# Patient Record
Sex: MALE
[De-identification: names, ages, dates, MRNs, and addresses within clinical notes are randomized per-mention and may not be internally consistent; named-entity substitution may affect disease eponyms.]

---

## 2019-06-27 ENCOUNTER — HOSPITAL ENCOUNTER (OUTPATIENT)
Dept: HOSPITAL 88 - SLEEP | Age: 39
End: 2019-06-27
Attending: INTERNAL MEDICINE
Payer: COMMERCIAL

## 2019-06-27 DIAGNOSIS — G47.33: Primary | ICD-10-CM

## 2019-06-27 DIAGNOSIS — E66.9: ICD-10-CM

## 2019-06-27 PROCEDURE — 95811 POLYSOM 6/>YRS CPAP 4/> PARM: CPT

## 2019-07-02 NOTE — POLYSOMNOGRAPHY
DATE OF STUDY:  

 

REFERRING PHYSICIAN:  

 

 

POLYSOMNOGRAM REPORT 

 

Patient of Dr. Geradr Millan. 

 

The patient with a history of daytime hypersomnolence, loud snoring witnessed by others.

 The patient is 5 feet 9 inches, 550 pounds, neck size 20.5, BMI 81.2.  He was monitored

using standard EEG lead montage including electrooculogram, submentalis EMG, anterior

tibialis EMG, nasal and oral thermistors, ribcage and abdominal strain gauge, monitor

pulse oximetry, EKG monitor.  The study was abnormal.  A split night study was

performed.  The initial portion of the study was the diagnostic, portion lasted 136

minutes.  Stage 1 and 2 sleep were recorded, sleep efficiency was 78%.  There were 64

obstructive apneas, 18 respiratory event related arousals.  Respiratory disturbance

index was 145.2 consistent with very severe obstructive sleep apnea.  There was

significant O2 desaturation as low as 53%.  Certainly, weight reduction should be part

of the patient's therapeutic program as well as examination of the nasal and oropharynx

as this has not been performed recently as well as assay of thyroid function.  He should

be warned not to drive when left untreated.  In addition to weight reduction,

therapeutic options would include tracheostomy, uvulopalatopharyngoplasty with jaw

advancement and nasal CPAP.  Second portion of the study was a nasal CPAP titration.  It

was initiated at 5 cm of water pressure eventually rising to a BiPAP of 19/15 with

supplemental oxygen bled in at 4 L.  His hypoxia persisted.  It is recommended that the

patient be given home trial of BiPAP at a level of 19/15 with 4 L of oxygen bled in.  A

full face mask is recommended.  During this study, there was persistent leak and an

alternative mask is recommended which will require refitting. 

 

IMPRESSION:  One of very severe obstructive sleep apnea, ameliorated with a BiPAP of

19/15 with 4 L supplemental oxygen.  REM rebound was noted and there was improvement in

O2 saturation as well as sleep efficiency to 94% during the titration. 

 

Thank you for this kind referral.

 

 

 

 

______________________________

MD PATTI Mooney/MODL

D:  07/02/2019 08:35:02

T:  07/02/2019 10:42:00

Job #:  [id number]